# Patient Record
Sex: FEMALE | Race: WHITE | Employment: FULL TIME
[De-identification: names, ages, dates, MRNs, and addresses within clinical notes are randomized per-mention and may not be internally consistent; named-entity substitution may affect disease eponyms.]

---

## 2022-10-09 ENCOUNTER — NURSE TRIAGE (OUTPATIENT)
Dept: OTHER | Facility: CLINIC | Age: 32
End: 2022-10-09

## 2022-10-09 NOTE — TELEPHONE ENCOUNTER
Subjective: Caller states something weird just happened, doing house chores, felt like vision left eye got very blurry for few minutes and stayed a little blurry for 20 more minutes, got anxious. Left side face became tingly   h/o migraines   Does not get tingling with migraines in the past ,denies numbness      Current Symptoms: A little tingling on left side of face now, feels weird on left side of face ,jaw and cheek area feels like may be sinus problem on cheek, b/p 135/82 mild tightness in temples. Onset: 11:40 a.m. Associated Symptoms: NA    Pain Severity: Denies    Temperature:Denies     What has been tried:     LMP: NA Pregnant: No    Recommended disposition: See HCP within 4 Hours (or PCP triage)    Care advice provided, patient verbalizes understanding; denies any other questions or concerns; instructed to call back for any new or worsening symptoms. Patient/caller agrees to proceed to nearest [facility] or ER     This triage is a result of a call to [address] [place] [address]. Please do not respond to the triage nurse through this encounter. Any subsequent communication should be directly with the patient.     Reason for Disposition   [1] Tingling (e.g., pins and needles) of the face, arm / hand, or leg / foot on one side of the body AND [2] present now (Exceptions: chronic/recurrent symptom lasting > 4 weeks or tingling from known cause, such as: bumped elbow, carpal tunnel syndrome, pinched nerve, frostbite)    Protocols used: Neurologic Deficit-ADULT-

## 2023-09-05 ENCOUNTER — NURSE TRIAGE (OUTPATIENT)
Dept: OTHER | Facility: CLINIC | Age: 33
End: 2023-09-05

## 2023-09-05 NOTE — TELEPHONE ENCOUNTER
Location of patient: [place]    Subjective: Caller states \"I have allergic reaction. I have very tender/sore breast. It started after wearing silicone sticky bra\"     Current Symptoms:   Tender and swollen quarter size left breast     Onset: 1 day ago; gradual    Pain Severity: 5/10; tender constant    Temperature: no    What has been tried: na    Pregnant: No    Recommended disposition: See in Office Today    Care advice provided, patient verbalizes understanding; denies any other questions or concerns; instructed to call back for any new or worsening symptoms. Patient/caller agrees to proceed to nearest [facility]     This triage is a result of a call to [address] [place] [address]. Please do not respond to the triage nurse through this encounter. Any subsequent communication should be directly with the patient.     Reason for Disposition   Breast looks infected (spreading redness, feels hot or painful to touch) and no fever    Protocols used: Breast Symptoms-ADULT-[place]